# Patient Record
(demographics unavailable — no encounter records)

---

## 2024-12-09 NOTE — HISTORY OF PRESENT ILLNESS
[FreeTextEntry1] : # hypothyroidism  - diagnosed at the age of 16 as she had delayed puberty and then irregular periods, was started on levothyroxine and then periods were ok  - she just moved from [place] , had labs prior to coming and was ok  - on levothyroxine 50 mcg daily , good pill technique  but she reports skipping taking levothyroxine for 1 week twice over past 2 months  - she is clinically euthyroid except for weight gain

## 2024-12-09 NOTE — END OF VISIT
[Time Spent: ___ minutes] : I have spent [unfilled] minutes of time on the encounter which excludes teaching and separately reported services.
none

## 2024-12-09 NOTE — PHYSICAL EXAM
[Alert] : alert [Healthy Appearance] : healthy appearance [No Acute Distress] : no acute distress [Thyroid Not Enlarged] : the thyroid was not enlarged [No Thyroid Nodules] : no palpable thyroid nodules [No Respiratory Distress] : no respiratory distress [No Accessory Muscle Use] : no accessory muscle use [Clear to Auscultation] : lungs were clear to auscultation bilaterally [Normal S1, S2] : normal S1 and S2 [No Murmurs] : no murmurs [Regular Rhythm] : with a regular rhythm [No Edema] : no peripheral edema [Soft] : abdomen soft

## 2024-12-09 NOTE — ASSESSMENT
[FreeTextEntry1] : # hypothyroidism - diagnosed at the age of 16 as she had delayed puberty and then irregular periods, was started on levothyroxine and then periods were ok - she just moved from [place] , had labs prior to coming and was ok - on levothyroxine 50 mcg daily , good pill technique but she reports skipping taking levothyroxine for 1 week twice over past 2 months - she is clinically euthyroid except for weight gain   continue Lt4 50 mcg daily for now , as she recently skipped, compliance advised , keep menstrual calendar  - monitor labs  - discussed diet and exercise

## 2025-01-16 NOTE — PLAN
[FreeTextEntry1] :  1. Nutrition consult with diet history 2.  Followup Dr. Centeno in 8 weeks 3. Increase protein in take to 60-70 gms daily 4. start strength training again  Patient does not want to consider AOM's at this point. No prediabetes so phentermine would be good choice if does not want to try GLP-1

## 2025-01-16 NOTE — PHYSICAL EXAM
[Alert] : alert [Oriented to Person] : oriented to person [Oriented to Place] : oriented to place [Oriented to Time] : oriented to time [Calm] : calm [de-identified] : well appearing female in NAD

## 2025-01-16 NOTE — HISTORY OF PRESENT ILLNESS
[de-identified] : Initial Medical Weight Loss Intake: - Reason for Visit : Patient presents today for their initial medical weight loss visit - History of Present Illness (HPI) : - Current Weight/BMI : Current weight is 290 lbs, height is 64 inches. - Highest Weight : Patient reports this is the highest weight they've ever been. - Lowest Weight : 134 lbs Previous Weight Loss Efforts :    - Medications/Supplements : None    - Procedures/Operations : No previous weight loss surgeries.     - Medications Contributing to Weight Gain : None Diet and Nutrition :    - Vitamin Deficiencies : No known vitamin deficiencies.    - Meal Frequency : Patient typically eats 2-3 meals per day.    - Snacking Habits : Not specified in the conversation.    - Alcohol Consumption : Not specified in the conversation.    - Dietary Preferences or Restrictions : Patient reports eating out frequently due to time constraints from working two jobs.    - Eating Behaviors : Patient mentions difficulty cooking due to work schedule.  Diet Recall :    - Breakfast : Cereal ([employer])    - Lunch : Wrap    - Dinner : Wrap    - Snacks : Not specified in the conversation.    - Beverages : Not specified in the conversation.  - Movement Assessments : Patient previously did Cross[employer] but has stopped. Currently no regular exercise routine.

## 2025-03-20 NOTE — PLAN
[FreeTextEntry1] : 1. Nutrition consult with diet history 2.  Followup Dr. Centeno in 12 weeks 3. continue strength training 4. keep protein intake up

## 2025-03-20 NOTE — PHYSICAL EXAM
[Alert] : alert [Oriented to Person] : oriented to person [Oriented to Time] : oriented to time [Calm] : calm [de-identified] : well appearing female in NAD

## 2025-03-20 NOTE — HISTORY OF PRESENT ILLNESS
[de-identified] : Return [facility] visit     Initial weight/BMI:  170 lbs     Current weight: 164 lbs     Weight change: 6 lbs   HPI:   Since our last visit:      Response to AOM (if started): none at present    Tolerability    Response to meds    Weight loss    Side effects    Timing of medication   Changes in lifestyle habits:     working to reduce calories     working out in gym weekly